# Patient Record
Sex: FEMALE | Race: WHITE | NOT HISPANIC OR LATINO | URBAN - METROPOLITAN AREA
[De-identification: names, ages, dates, MRNs, and addresses within clinical notes are randomized per-mention and may not be internally consistent; named-entity substitution may affect disease eponyms.]

---

## 2017-05-23 DIAGNOSIS — Z12.31 ENCOUNTER FOR SCREENING MAMMOGRAM FOR MALIGNANT NEOPLASM OF BREAST: ICD-10-CM

## 2017-05-24 ENCOUNTER — ALLSCRIPTS OFFICE VISIT (OUTPATIENT)
Dept: OTHER | Facility: OTHER | Age: 57
End: 2017-05-24

## 2017-05-24 ENCOUNTER — GENERIC CONVERSION - ENCOUNTER (OUTPATIENT)
Dept: OTHER | Facility: OTHER | Age: 57
End: 2017-05-24

## 2017-05-27 ENCOUNTER — GENERIC CONVERSION - ENCOUNTER (OUTPATIENT)
Dept: OTHER | Facility: OTHER | Age: 57
End: 2017-05-27

## 2017-05-27 LAB
ADEQUACY: (HISTORICAL): NORMAL
CLINICIAN PROVIDIED ICD 9 OR 10 (HISTORICAL): NORMAL
COMMENT (HISTORICAL): NORMAL
DIAGNOSIS (HISTORICAL): NORMAL
NOTE: (HISTORICAL): NORMAL
PATHOLOGIST PROVIDED ICD10 (HISTORICAL): NORMAL
PERFORMED BY (HISTORICAL): NORMAL
QC REVIEWED BY (HISTORICAL): NORMAL
REFLEX (HISTORICAL): NORMAL
TEST INFORMATION (HISTORICAL): NORMAL

## 2017-06-01 ENCOUNTER — GENERIC CONVERSION - ENCOUNTER (OUTPATIENT)
Dept: OTHER | Facility: OTHER | Age: 57
End: 2017-06-01

## 2018-01-13 VITALS
WEIGHT: 168.5 LBS | HEIGHT: 64 IN | BODY MASS INDEX: 28.77 KG/M2 | DIASTOLIC BLOOD PRESSURE: 86 MMHG | SYSTOLIC BLOOD PRESSURE: 122 MMHG

## 2018-01-13 NOTE — MISCELLANEOUS
Message  Pt called she is c/o vaginal itching and irritation d/c is hard to see due to using Monistat last week will treat with Dilfucan if no better needs appt is over due for appt   Active Problems    1  Age related osteoporosis (733 01) (M81 0)   2  Encounter for routine gynecological examination with Papanicolaou smear of cervix   (V72 31,V76 2) (Z01 419)   3  Menopausal symptoms (627 2) (N95 1)   4  Postmenopausal bleeding (627 1) (N95 0)   5  Vaginal candidiasis (112 1) (B37 3)   6  Visit for screening mammogram (V76 12) (Z12 31)    Current Meds   1  Allegra Allergy TABS (Fexofenadine HCl) Recorded   2  Aspirin 81 MG TABS Recorded   3  DiltiaZEM HCl ER Coated Beads 240 MG Oral Capsule Extended Release 24 Hour; Therapy: [id number] to (Evaluate:28Nov2014) Recorded   4  Fluconazole 150 MG Oral Tablet; tAKE ONE TABLET NOW AND THEN REPEAT IN 3   DAYS; Therapy: [id number] to (Evaluate:96Por1451)  Requested for: 66Qnd8525; Last   Rx:[id number]; Status: ACTIVE - Retrospective Authorization Ordered   5  FLUoxetine HCl - 40 MG Oral Capsule; Therapy: 01LRS5888 to (Evaluate:28Nov2014) Recorded   6  Simvastatin 20 MG Oral Tablet; Therapy: [id number] to (Evaluate:28Nov2014) Recorded    Allergies    1  Clindamycin HCl CAPS   2  Erythromycin TABS   3  Lisinopril-Hydrochlorothiazide TABS   4  Penicillins   5   Sulfa Drugs   6  tetracycline    Signatures   Electronically signed by : Jeannie Low, ; Dec 27 2016  1:46PM EST                       (Author)